# Patient Record
Sex: FEMALE | Race: WHITE | NOT HISPANIC OR LATINO | Employment: FULL TIME | URBAN - METROPOLITAN AREA
[De-identification: names, ages, dates, MRNs, and addresses within clinical notes are randomized per-mention and may not be internally consistent; named-entity substitution may affect disease eponyms.]

---

## 2017-01-04 ENCOUNTER — TELEPHONE (OUTPATIENT)
Dept: OBGYN | Facility: CLINIC | Age: 47
End: 2017-01-04

## 2017-01-04 NOTE — TELEPHONE ENCOUNTER
Called pathology to see what happened to pap result. Pathology trying to locate pap results and will call back to let our office know. If no results, patient may need to come back for a repeat pap.

## 2017-01-04 NOTE — TELEPHONE ENCOUNTER
Pathology called back stating pap was never received. Pap was collected in office and slip printed out. No record of pap being sent to different lab than [facility].

## 2017-01-04 NOTE — TELEPHONE ENCOUNTER
Called pt to schedule repeat pap. Left message for pt to call back and schedule. Next available for Dr Farley is in Feb.2017.

## 2017-05-15 ENCOUNTER — HOSPITAL ENCOUNTER (OUTPATIENT)
Dept: RADIOLOGY | Facility: MEDICAL CENTER | Age: 47
End: 2017-05-15
Attending: OBSTETRICS & GYNECOLOGY
Payer: COMMERCIAL

## 2017-05-15 DIAGNOSIS — Z12.39 SCREENING BREAST EXAMINATION: ICD-10-CM

## 2017-05-15 PROCEDURE — 77063 BREAST TOMOSYNTHESIS BI: CPT

## 2017-10-20 DIAGNOSIS — Z11.51 SCREENING FOR HUMAN PAPILLOMAVIRUS: ICD-10-CM

## 2017-10-20 DIAGNOSIS — Z12.4 ENCOUNTER FOR SCREENING FOR MALIGNANT NEOPLASM OF CERVIX: ICD-10-CM

## 2017-10-20 DIAGNOSIS — Z01.419 WELL FEMALE EXAM WITH ROUTINE GYNECOLOGICAL EXAM: ICD-10-CM

## 2017-10-20 NOTE — TELEPHONE ENCOUNTER
Yvon called and will have annual in Jan can you call in 90 day refill to get through till her appt of her Birthcontrol  Patient has changes to mail order [facility] Fax-4 [phone]   [phone]

## 2017-11-06 DIAGNOSIS — Z78.9 USES BIRTH CONTROL: ICD-10-CM

## 2017-11-06 NOTE — TELEPHONE ENCOUNTER
----- Message from Elina Amaral sent at 11/6/2017  9:41 AM PST -----  Regarding: follow up prescription  Contact: [phone]  Patient called following up on her prescription that is suppose to be sent over [facility]. Please call back. Thank you!

## 2017-11-06 NOTE — TELEPHONE ENCOUNTER
Pt would like RX [facility]. First RX was sent to [facility] and patient states it is too expensive. Please review and sign  Thank you

## 2018-01-04 ENCOUNTER — HOSPITAL ENCOUNTER (OUTPATIENT)
Facility: MEDICAL CENTER | Age: 48
End: 2018-01-04
Attending: OBSTETRICS & GYNECOLOGY
Payer: COMMERCIAL

## 2018-01-04 ENCOUNTER — GYNECOLOGY VISIT (OUTPATIENT)
Dept: OBGYN | Facility: CLINIC | Age: 48
End: 2018-01-04
Payer: COMMERCIAL

## 2018-01-04 VITALS — DIASTOLIC BLOOD PRESSURE: 70 MMHG | SYSTOLIC BLOOD PRESSURE: 114 MMHG | WEIGHT: 140 LBS | BODY MASS INDEX: 24.02 KG/M2

## 2018-01-04 DIAGNOSIS — Z12.4 SCREENING FOR CERVICAL CANCER: ICD-10-CM

## 2018-01-04 DIAGNOSIS — Z11.51 SPECIAL SCREENING EXAMINATION FOR HUMAN PAPILLOMAVIRUS (HPV): ICD-10-CM

## 2018-01-04 DIAGNOSIS — Z01.419 WELL WOMAN EXAM: ICD-10-CM

## 2018-01-04 PROCEDURE — 99396 PREV VISIT EST AGE 40-64: CPT | Performed by: OBSTETRICS & GYNECOLOGY

## 2018-01-04 PROCEDURE — 87624 HPV HI-RISK TYP POOLED RSLT: CPT

## 2018-01-04 PROCEDURE — 88175 CYTOPATH C/V AUTO FLUID REDO: CPT

## 2018-01-04 NOTE — PROGRESS NOTES
Chief complaint annual gynecologic exam     Fransisca Meyero47 y.o.  female presents for Annual Well Woman Exam.   Patient is currently without complaints. Patient is   Having normal menses with last menstrual period 3 weeks ago.  Patient currently on combination OCPs and doing well. States she has been  on this medication for many many years. She denies breakthrough bleeding spotting, no hot flushes vaginal dryness.    ROS: Patient is feeling well. No dyspnea or chest pain on exertion. No Abdominal pain, change in bowel habits, black or bloody stools. No urinary sx. GYN ROS:normal menses, no abnormal bleeding, pelvic pain or discharge, no breast pain or new or enlarging lumps on self exam, no vaginal bleeding, no discharge or pelvic pain. Denies breast tenderness, mass, discharge, changes in size or contour, or abnormal cyclic discomfort. No neurological complaints.  Past Medical History:   Diagnosis Date   • Insomnia secondary to situational depression 2015   • Vaginal high risk HPV DNA test positive 2015     None and none  Allergy:   Patient has no known allergies.    LMP:       Patient's last menstrual period was 2017. .     Menstrual History: menses regular every 28 days      Pap history, the patient reports abnormal Pap smears and denies   sexually transmitted diseases   patient had abnormal Pap smear with positive HPV, colposcopy was negative   Pap smear was negative with negative HPV    Mammo:recent    Objective : The patient appears well, alert and oriented x 3, in no acute distress.  Blood pressure 114/70, weight 63.5 kg (140 lb), last menstrual period 2017, not currently breastfeeding.  HEENT Exam: EOMI, DEEPTHI, no adenopathy or thyromegaly.  Lungs: Clear to Auscultation   Cor: S1 and S2 normal, no murmurs, or rubs   Abdomen: Soft without tenderness, guarding mass or organomegaly.  Extremities: No edema, pulses equal  Neurological: Normal No focal signs  Breast  Exam:Inspection negative. No nipple discharge or bleeding. No masses or nodularity palpable  Pelvic: negative findings: external genitalia normal, Bartholin's glands, urethra, Hobart's glands negative, vaginal mucosa normal, cervix clear, normal sized uterus, adnexae negative    Lab:No results found for this or any previous visit (from the past 336 hour(s)).    Assessment:  well woman  no contraindication to continue use of oral contraceptives    Plan:mammogram  pap smear  counseled on breast self exam, use and side effects of OCPs and menopause  return annually or prn    Contraception:oral contraceptives

## 2018-01-05 LAB
CYTOLOGY REG CYTOL: ABNORMAL
HPV HR 12 DNA CVX QL NAA+PROBE: POSITIVE
HPV16 DNA SPEC QL NAA+PROBE: NEGATIVE
HPV18 DNA SPEC QL NAA+PROBE: NEGATIVE
SPECIMEN SOURCE: ABNORMAL

## 2018-03-12 ENCOUNTER — PATIENT OUTREACH (OUTPATIENT)
Dept: HEALTH INFORMATION MANAGEMENT | Facility: OTHER | Age: 48
End: 2018-03-12

## 2018-05-25 ENCOUNTER — APPOINTMENT (OUTPATIENT)
Dept: RADIOLOGY | Facility: MEDICAL CENTER | Age: 48
End: 2018-05-25
Attending: OBSTETRICS & GYNECOLOGY
Payer: COMMERCIAL

## 2018-06-01 ENCOUNTER — HOSPITAL ENCOUNTER (OUTPATIENT)
Dept: RADIOLOGY | Facility: MEDICAL CENTER | Age: 48
End: 2018-06-01
Attending: OBSTETRICS & GYNECOLOGY
Payer: COMMERCIAL

## 2018-06-01 DIAGNOSIS — Z12.31 SCREENING MAMMOGRAM, ENCOUNTER FOR: ICD-10-CM

## 2018-06-01 PROCEDURE — 77067 SCR MAMMO BI INCL CAD: CPT

## 2018-07-03 ENCOUNTER — TELEPHONE (OUTPATIENT)
Dept: OBGYN | Facility: CLINIC | Age: 48
End: 2018-07-03

## 2018-07-03 NOTE — TELEPHONE ENCOUNTER
I called pharmacy to switch from 1 month supply to 3 months supply, per [place] from pharmacy on file. They will work on it and call pt to notify.    Pt notified.

## 2018-07-03 NOTE — TELEPHONE ENCOUNTER
----- Message from Elina Amaral sent at 7/2/2018  1:49 PM PDT -----  Regarding: refill request  Contact: [phone]  Patient called would like to get 2 3 month supply for her birth control sent over to her pharmacy on file.levonorgestrel-ethinyl estradiol (ENPRESSE) per tablet

## 2023-03-30 NOTE — PROGRESS NOTES
Attempt #:1    WebIZ Checked & Epic Updated: yes  [facility]Connect Verified: yes  Verify PCP: yes    Communication Preference Obtained: yes     Review Care Team: no    Care Gap Scheduling (Attempt to Schedule EACH Overdue Care Gap!)  Health Maintenance Due   Topic Date Due   • IMM DTaP/Tdap/Td Vaccine (1 - Tdap) 12/07/1987   • IMM INFLUENZA (1) 09/01/2017       DECLINED IMMUNIZATIONS-PT WILL DISCUSS WITH RADHA WHEN SHE SEES HER     Tiot Activation: already active  Corrie   Angiocath

## 2024-03-22 ENCOUNTER — PRE-ADMISSION TESTING (OUTPATIENT)
Dept: ADMISSIONS | Facility: MEDICAL CENTER | Age: 54
End: 2024-03-22
Attending: ORTHOPAEDIC SURGERY
Payer: COMMERCIAL

## 2024-03-22 VITALS — BODY MASS INDEX: 23.05 KG/M2 | HEIGHT: 64 IN | WEIGHT: 135 LBS

## 2024-03-22 RX ORDER — IBUPROFEN 200 MG
200 TABLET ORAL EVERY 6 HOURS PRN
COMMUNITY

## 2024-03-22 RX ORDER — ACETAMINOPHEN 500 MG
500-1000 TABLET ORAL EVERY 6 HOURS PRN
COMMUNITY

## 2024-03-22 NOTE — PREPROCEDURE INSTRUCTIONS
PreAdmit Telephone Appointment completed with pt, including pt HX and medication review.  Reviewed the Preparing for your procedure handout with patient over the phone. Patient instructed per pharmacy guidelines regarding taking or holding regularly prescribed medications before surgery. Instructed to take the following medications the day of surgery with a sip of water per pharmacy medication guidelines:  acetaminophen as needed. Stop all vitamins today and ibuprofen x 5 days.   Pt verbalizes understanding of all medication and preadmit instruction.     Pt instructed to report any flu/cold sx to surgeon. METS >4.

## 2024-03-27 ENCOUNTER — HOSPITAL ENCOUNTER (OUTPATIENT)
Facility: MEDICAL CENTER | Age: 54
End: 2024-03-27
Attending: ORTHOPAEDIC SURGERY | Admitting: ORTHOPAEDIC SURGERY
Payer: COMMERCIAL

## 2024-03-27 ENCOUNTER — ANESTHESIA EVENT (OUTPATIENT)
Dept: SURGERY | Facility: MEDICAL CENTER | Age: 54
End: 2024-03-27
Payer: COMMERCIAL

## 2024-03-27 ENCOUNTER — ANESTHESIA (OUTPATIENT)
Dept: SURGERY | Facility: MEDICAL CENTER | Age: 54
End: 2024-03-27
Payer: COMMERCIAL

## 2024-03-27 VITALS
HEIGHT: 64 IN | DIASTOLIC BLOOD PRESSURE: 75 MMHG | BODY MASS INDEX: 23.22 KG/M2 | TEMPERATURE: 98.2 F | OXYGEN SATURATION: 98 % | WEIGHT: 136.02 LBS | HEART RATE: 94 BPM | RESPIRATION RATE: 16 BRPM | SYSTOLIC BLOOD PRESSURE: 126 MMHG

## 2024-03-27 DIAGNOSIS — M65.321 TRIGGER INDEX FINGER OF RIGHT HAND: ICD-10-CM

## 2024-03-27 PROCEDURE — 160035 HCHG PACU - 1ST 60 MINS PHASE I: Performed by: ORTHOPAEDIC SURGERY

## 2024-03-27 PROCEDURE — 160025 RECOVERY II MINUTES (STATS): Performed by: ORTHOPAEDIC SURGERY

## 2024-03-27 PROCEDURE — 160009 HCHG ANES TIME/MIN: Performed by: ORTHOPAEDIC SURGERY

## 2024-03-27 PROCEDURE — 160028 HCHG SURGERY MINUTES - 1ST 30 MINS LEVEL 3: Performed by: ORTHOPAEDIC SURGERY

## 2024-03-27 PROCEDURE — 160002 HCHG RECOVERY MINUTES (STAT): Performed by: ORTHOPAEDIC SURGERY

## 2024-03-27 PROCEDURE — 700101 HCHG RX REV CODE 250: Performed by: ORTHOPAEDIC SURGERY

## 2024-03-27 PROCEDURE — 700111 HCHG RX REV CODE 636 W/ 250 OVERRIDE (IP): Mod: JZ | Performed by: ANESTHESIOLOGY

## 2024-03-27 PROCEDURE — 160048 HCHG OR STATISTICAL LEVEL 1-5: Performed by: ORTHOPAEDIC SURGERY

## 2024-03-27 PROCEDURE — 700111 HCHG RX REV CODE 636 W/ 250 OVERRIDE (IP): Performed by: ORTHOPAEDIC SURGERY

## 2024-03-27 PROCEDURE — 160046 HCHG PACU - 1ST 60 MINS PHASE II: Performed by: ORTHOPAEDIC SURGERY

## 2024-03-27 RX ORDER — MIDAZOLAM HYDROCHLORIDE 1 MG/ML
INJECTION INTRAMUSCULAR; INTRAVENOUS PRN
Status: DISCONTINUED | OUTPATIENT
Start: 2024-03-27 | End: 2024-03-27 | Stop reason: SURG

## 2024-03-27 RX ORDER — OXYCODONE HCL 5 MG/5 ML
5 SOLUTION, ORAL ORAL
Status: DISCONTINUED | OUTPATIENT
Start: 2024-03-27 | End: 2024-03-27 | Stop reason: HOSPADM

## 2024-03-27 RX ORDER — ONDANSETRON 2 MG/ML
4 INJECTION INTRAMUSCULAR; INTRAVENOUS
Status: DISCONTINUED | OUTPATIENT
Start: 2024-03-27 | End: 2024-03-27 | Stop reason: HOSPADM

## 2024-03-27 RX ORDER — MEPERIDINE HYDROCHLORIDE 25 MG/ML
12.5 INJECTION INTRAMUSCULAR; INTRAVENOUS; SUBCUTANEOUS
Status: DISCONTINUED | OUTPATIENT
Start: 2024-03-27 | End: 2024-03-27 | Stop reason: HOSPADM

## 2024-03-27 RX ORDER — BUPIVACAINE HYDROCHLORIDE AND EPINEPHRINE 5; 5 MG/ML; UG/ML
INJECTION, SOLUTION PERINEURAL
Status: DISCONTINUED | OUTPATIENT
Start: 2024-03-27 | End: 2024-03-27 | Stop reason: HOSPADM

## 2024-03-27 RX ORDER — HYDROMORPHONE HYDROCHLORIDE 1 MG/ML
0.2 INJECTION, SOLUTION INTRAMUSCULAR; INTRAVENOUS; SUBCUTANEOUS
Status: DISCONTINUED | OUTPATIENT
Start: 2024-03-27 | End: 2024-03-27 | Stop reason: HOSPADM

## 2024-03-27 RX ORDER — HYDROMORPHONE HYDROCHLORIDE 1 MG/ML
0.1 INJECTION, SOLUTION INTRAMUSCULAR; INTRAVENOUS; SUBCUTANEOUS
Status: DISCONTINUED | OUTPATIENT
Start: 2024-03-27 | End: 2024-03-27 | Stop reason: HOSPADM

## 2024-03-27 RX ORDER — OXYCODONE HCL 5 MG/5 ML
10 SOLUTION, ORAL ORAL
Status: DISCONTINUED | OUTPATIENT
Start: 2024-03-27 | End: 2024-03-27 | Stop reason: HOSPADM

## 2024-03-27 RX ORDER — SODIUM CHLORIDE, SODIUM LACTATE, POTASSIUM CHLORIDE, CALCIUM CHLORIDE 600; 310; 30; 20 MG/100ML; MG/100ML; MG/100ML; MG/100ML
INJECTION, SOLUTION INTRAVENOUS CONTINUOUS
Status: DISCONTINUED | OUTPATIENT
Start: 2024-03-27 | End: 2024-03-27 | Stop reason: HOSPADM

## 2024-03-27 RX ORDER — CEFAZOLIN SODIUM 1 G/3ML
INJECTION, POWDER, FOR SOLUTION INTRAMUSCULAR; INTRAVENOUS PRN
Status: DISCONTINUED | OUTPATIENT
Start: 2024-03-27 | End: 2024-03-27 | Stop reason: SURG

## 2024-03-27 RX ORDER — HALOPERIDOL 5 MG/ML
1 INJECTION INTRAMUSCULAR
Status: DISCONTINUED | OUTPATIENT
Start: 2024-03-27 | End: 2024-03-27 | Stop reason: HOSPADM

## 2024-03-27 RX ORDER — HYDROMORPHONE HYDROCHLORIDE 1 MG/ML
0.4 INJECTION, SOLUTION INTRAMUSCULAR; INTRAVENOUS; SUBCUTANEOUS
Status: DISCONTINUED | OUTPATIENT
Start: 2024-03-27 | End: 2024-03-27 | Stop reason: HOSPADM

## 2024-03-27 RX ORDER — DIPHENHYDRAMINE HYDROCHLORIDE 50 MG/ML
12.5 INJECTION INTRAMUSCULAR; INTRAVENOUS
Status: DISCONTINUED | OUTPATIENT
Start: 2024-03-27 | End: 2024-03-27 | Stop reason: HOSPADM

## 2024-03-27 RX ADMIN — PROPOFOL 50 MG: 10 INJECTION, EMULSION INTRAVENOUS at 16:45

## 2024-03-27 RX ADMIN — FENTANYL CITRATE 50 MCG: 50 INJECTION, SOLUTION INTRAMUSCULAR; INTRAVENOUS at 16:38

## 2024-03-27 RX ADMIN — FENTANYL CITRATE 50 MCG: 50 INJECTION, SOLUTION INTRAMUSCULAR; INTRAVENOUS at 16:48

## 2024-03-27 RX ADMIN — MIDAZOLAM HYDROCHLORIDE 2 MG: 1 INJECTION, SOLUTION INTRAMUSCULAR; INTRAVENOUS at 16:38

## 2024-03-27 RX ADMIN — CEFAZOLIN 2 G: 1 INJECTION, POWDER, FOR SOLUTION INTRAMUSCULAR; INTRAVENOUS at 16:41

## 2024-03-27 ASSESSMENT — PAIN DESCRIPTION - PAIN TYPE
TYPE: ACUTE PAIN;CHRONIC PAIN
TYPE: SURGICAL PAIN

## 2024-03-27 NOTE — DISCHARGE INSTRUCTIONS
Surgeon specific instructions    Keep dressings clean, dry and intact  No lifting anything heavier than 10 lbs with the operative hand  May remove dressings after 48 hrs  May begin showering and washing hands as normal after 48 hrs  Do not soak the wound  Keep hand elevated and apply ice as much as possible in the first three days  Do not operate a vehicle or machinery while taking narcotic pain medications  Return to clinic in 10-14 days  Please call the office with any questions [phone]       ACTIVITY: Rest and take it easy for the first 24 hours.  A responsible adult is recommended to remain with you during that time.  It is normal to feel sleepy.  We encourage you to not do anything that requires balance, judgment or coordination.    MILD FLU-LIKE SYMPTOMS ARE NORMAL. YOU MAY EXPERIENCE GENERALIZED MUSCLE ACHES, THROAT IRRITATION, HEADACHE AND/OR SOME NAUSEA.    FOR 24 HOURS DO NOT:  Drive, operate machinery or run household appliances.  Drink beer or alcoholic beverages.   Make important decisions or sign legal documents.    DIET: To avoid nausea, slowly advance diet as tolerated, avoiding spicy or greasy foods for the first day.  Add more substantial food to your diet according to your physician's instructions. INCREASE FLUIDS AND FIBER TO AVOID CONSTIPATION.    FOLLOW-UP APPOINTMENT:  A follow-up appointment should be arranged with your doctor; call to schedule.    You should CALL YOUR PHYSICIAN if you develop:  Fever greater than 101 degrees F.  Pain not relieved by medication, or persistent nausea or vomiting.  Excessive bleeding (blood soaking through dressing) or unexpected drainage from the wound.  Extreme redness or swelling around the incision site, drainage of pus or foul smelling drainage.  Inability to urinate or empty your bladder within 8 hours.  Problems with breathing or chest pain.    You should call 911 if you develop problems with breathing or chest pain.  If you are unable to contact  your doctor or surgical center, you should go to the nearest emergency room or urgent care center.  Physician's telephone #: [phone]    If any questions arise, call your doctor.  If your doctor is not available, please feel free to call the Surgical Center at [phone].     A registered nurse may call you a few days after your surgery to see how you are doing after your procedure.    MEDICATIONS: Resume taking daily medication.  Take prescribed pain medication with food.  If no medication is prescribed, you may take non-aspirin pain medication if needed.  PAIN MEDICATION CAN BE VERY CONSTIPATING.  Take a stool softener or laxative such as senokot, pericolace, or milk of magnesia if needed.    Last pain medication given:  None needed in recovery, may begin pain medication at home when needed.     If your physician has prescribed pain medication that includes Acetaminophen (Tylenol), do not take additional Acetaminophen (Tylenol) while taking the prescribed medication.

## 2024-03-27 NOTE — OP REPORT
Date of surgery: 3/27/2024    Diagnosis: Right index trigger finger  Right middle trigger finger    Postoperative diagnosis: Right index trigger finger  Right middle trigger finger    Surgery performed: Right index finger A1 pulley release  Right middle finger A1 pulley release    Surgeon: Zafar Daniels M.D.    Anesthesia: Local    Tourniquet time: 7 minutes    Tourniquet pressure: 250 mmHg    Complications: None    Indications for procedure: The patient has had persistent right index and middle trigger finger and has tried rest, activity modifications, NSAIDs and steroid injections. All of these have failed to resolve the symptoms. For these reasons the decision was made to take them to the operating room today for the above-mentioned procedure.    Description of procedure: On the day of surgery the patient was seen in the preoperative area where informed consent was obtained with all risks and benefits of the procedure explained and all questions answered. The patient wished to proceed with surgery. The proper site was marked. They were subsequently taken to the operating room and placed in the supine position with all bony prominences well-padded. Local anesthesia was induced with equal parts of 0.5% bupivacaine with epinephrine and 1% lidocaine with epinephrine. A tourniquet was placed on the operative extremity and it was then prepped and draped in the usual sterile fashion.    A timeout was performed without persons in attendance agreed on the proper patient, proper surgical site and proper surgery to be performed.    An Esmarch was used to exsanguinate the right upper extremity and the tourniquet was insufflated to 250 mmHg. A longitudinal incision was made directly over the A1 pulley of the index finger. Sharp and blunt dissection was taken down to the level of the A1 pulley. Soft tissue retractors were placed and the A1 pulley was visualized. A Dewey blade was used to incise the A1 pulley. Tenotomy  scissors were then used to complete the A1 pulley release both proximally and distally. The patient was then asked to flex and extend the fingers and make a full fist. There was no more triggering. The wound was then thoroughly irrigated with copious amounts of normal saline. The wound was then closed with 4-0 nylon in a horizontal mattress fashion.     A longitudinal incision was made directly over the A1 pulley of the middle finger. Sharp and blunt dissection was taken down to the level of the A1 pulley. Soft tissue retractors were placed and the A1 pulley was visualized. A Stockbridge blade was used to incise the A1 pulley. Tenotomy scissors were then used to complete the A1 pulley release both proximally and distally. The patient was then asked to flex and extend the fingers and make a full fist. There was no more triggering. The wound was then thoroughly irrigated with copious amounts of normal saline. The wound was then closed with 4-0 nylon in a horizontal mattress fashion.The wound was dressed with Xeroform, 4 x 4's and a Tegaderm dressing. The tourniquet was deflated. The patient was taken to the PACU in good and stable condition.    Disposition: The patient will be discharged home on a regular diet, weightbearing as tolerated with the exception of a 10 pound lifting restriction. Dressings may be removed after 48 hours at which time the patient may begin showering and washing hands as normal. They should not soak the wound. The patient was prescribed tramadol 50 mg 1 tab by mouth every 4 hours when necessary pain. They were instructed not to drive or operate machinery while taking this medication.  They will return to clinic in 10-14 days.

## 2024-03-27 NOTE — ANESTHESIA PREPROCEDURE EVALUATION
Case: [id number] Date/Time: 03/27/24 1645    Procedure: RIGHT INDEX AND MIDDLE FINGER A1 PULLEY RELEASE    Pre-op diagnosis: BILATERAL ACQUIRED TRIGGER FINGER OF INDEX FINGERS    Location: [place] [address] / SURGERY [facility]    Surgeons: Zafar Daniels M.D.            Relevant Problems   No relevant active problems       Physical Exam    Airway   Mallampati: II  TM distance: >3 FB  Neck ROM: full       Cardiovascular - normal exam  Rhythm: regular  Rate: normal  (-) murmur     Dental - normal exam           Pulmonary - normal exam  Breath sounds clear to auscultation     Abdominal    Neurological - normal exam                   Anesthesia Plan    ASA 1       Plan - MAC               Induction: intravenous    Postoperative Plan: Postoperative administration of opioids is intended.    Pertinent diagnostic labs and testing reviewed    Informed Consent:    Anesthetic plan and risks discussed with patient.    Use of blood products discussed with: patient whom consented to blood products.

## 2024-03-28 NOTE — ANESTHESIA TIME REPORT
Anesthesia Start and Stop Event Times       Date Time Event    3/27/2024 1637 Anesthesia Start     1640 Ready for Procedure     1703 Anesthesia Stop          Responsible Staff  03/27/24      Name Role Begin End    Tobey Gansert, M.D. Anesth 1637 1703          Overtime Reason:  no overtime (within assigned shift)    Comments:

## 2024-03-28 NOTE — ANESTHESIA POSTPROCEDURE EVALUATION
Patient: Fransisca Villalta    Procedure Summary       Date: 03/27/24 Room / Location:  OR  / SURGERY [facility]    Anesthesia Start: 1637 Anesthesia Stop: 1703    Procedure: RIGHT INDEX AND MIDDLE FINGER A1 PULLEY RELEASE (Right: Finger) Diagnosis: (right ACQUIRED TRIGGER FINGER OF INDEX FINGERS)    Surgeons: Zafar Daniels M.D. Responsible Provider: Tobey Gansert, M.D.    Anesthesia Type: MAC ASA Status: 1            Final Anesthesia Type: MAC  Last vitals  BP   Blood Pressure: 121/84    Temp        Pulse   85   Resp   18    SpO2   97 %      Anesthesia Post Evaluation    Patient location during evaluation: PACU  Patient participation: complete - patient participated  Level of consciousness: awake and alert    Airway patency: patent  Anesthetic complications: no  Cardiovascular status: hemodynamically stable  Respiratory status: acceptable  Hydration status: euvolemic    PONV: none          No notable events documented.     Nurse Pain Score: 6 (NPRS)

## 2024-03-28 NOTE — OR NURSING
1737: Pt arrive to stage 2 via gurney. Dressed and up to chair. Denies pain and nausea. Stable on RA. Family present.    1805: DC education provided to pt and pt family, both verbalized understanding. D/Jordy to care of family post uneventful stay in PACU 2. Assisted out to car in .

## 2024-03-28 NOTE — OR NURSING
1659 - Pt arrived from OR, report received from RN/anesthesia, R hand dressing CDI, VSS.    1715 - Pt awake, resting comfortably, denies pain/nasuea, tolerating sips of water, VSS    1730 -  updated via phone.  Pt sitting up, sipping soda, no complaints of pain/nausea, VSS.  Pt meets criteria for transfer to stage II.      1737 - Report to Thuy RN, pt transferred to stage II via hugo with belongings by CNA

## 2024-08-07 ENCOUNTER — HOSPITAL ENCOUNTER (OUTPATIENT)
Dept: RADIOLOGY | Facility: MEDICAL CENTER | Age: 54
End: 2024-08-07
Payer: COMMERCIAL

## 2024-08-22 ENCOUNTER — HOSPITAL ENCOUNTER (OUTPATIENT)
Dept: RADIOLOGY | Facility: MEDICAL CENTER | Age: 54
End: 2024-08-22
Attending: OBSTETRICS & GYNECOLOGY
Payer: COMMERCIAL

## 2024-08-22 DIAGNOSIS — Z12.31 ENCOUNTER FOR MAMMOGRAM TO ESTABLISH BASELINE MAMMOGRAM: ICD-10-CM

## 2024-08-22 PROCEDURE — 77067 SCR MAMMO BI INCL CAD: CPT

## (undated) DEVICE — SPONGE GAUZESTER. 2X2 4-PL - (2/PK 50PK/BX 30BX/CS)

## (undated) DEVICE — PACK UPPER EXTREMITY SM OR - (3/CA)

## (undated) DEVICE — SODIUM CHL IRRIGATION 0.9% 1000ML (12EA/CA)

## (undated) DEVICE — COVER LIGHT HANDLE FLEXIBLE - SOFT (2EA/PK 80PK/CA)

## (undated) DEVICE — ADHESIVE MASTISOL - (48/BX)

## (undated) DEVICE — ELECTRODE DUAL RETURN W/ CORD - (50/PK)

## (undated) DEVICE — GLOVE BIOGEL INDICATOR SZ 8 SURGICAL PF LTX - (50/BX 4BX/CA)

## (undated) DEVICE — DRESSING TRANSPARENT FILM TEGADERM 2.375 X 2.75"  (100EA/BX)"

## (undated) DEVICE — PAD PREP 24 X 48 CUFFED - (100/CA)

## (undated) DEVICE — LACTATED RINGERS INJ 1000 ML - (14EA/CA 60CA/PF)

## (undated) DEVICE — BLADE BEAVER 6400 MINI EYE ROUND TIP SHARP ON ONE SIDE (20/CA)

## (undated) DEVICE — GLOVE BIOGEL SZ 8 SURGICAL PF LTX - (50PR/BX 4BX/CA)

## (undated) DEVICE — TOURNIQUET CUFF 18 X 3 ONE PORT DISP - STERILE (10/BX)

## (undated) DEVICE — CHLORAPREP 26 ML APPLICATOR - ORANGE TINT(25/CA)

## (undated) DEVICE — SUTURE 4-0 ETHILON PS-2 18 (12PK/BX)"

## (undated) DEVICE — TOWEL STOP TIMEOUT SAFETY FLAG (40EA/CA)